# Patient Record
Sex: FEMALE | Race: WHITE | NOT HISPANIC OR LATINO | URBAN - METROPOLITAN AREA
[De-identification: names, ages, dates, MRNs, and addresses within clinical notes are randomized per-mention and may not be internally consistent; named-entity substitution may affect disease eponyms.]

---

## 2022-05-10 ENCOUNTER — PREPPED CHART (OUTPATIENT)
Dept: URBAN - METROPOLITAN AREA CLINIC 39 | Facility: CLINIC | Age: 87
End: 2022-05-10

## 2022-06-07 ENCOUNTER — CONSULTATION/EVALUATION (OUTPATIENT)
Dept: URBAN - METROPOLITAN AREA CLINIC 39 | Facility: CLINIC | Age: 87
End: 2022-06-07

## 2022-06-07 DIAGNOSIS — H18.513: ICD-10-CM

## 2022-06-07 DIAGNOSIS — H35.372: ICD-10-CM

## 2022-06-07 DIAGNOSIS — H25.813: ICD-10-CM

## 2022-06-07 PROCEDURE — 99204 OFFICE O/P NEW MOD 45 MIN: CPT

## 2022-06-07 PROCEDURE — 92134 CPTRZ OPH DX IMG PST SGM RTA: CPT

## 2022-06-07 PROCEDURE — 92015 DETERMINE REFRACTIVE STATE: CPT

## 2022-06-07 PROCEDURE — 92286 ANT SGM IMG I&R SPECLR MIC: CPT

## 2022-06-07 PROCEDURE — V2799PMN IMPRIMIS PRED-MOXI-NEPAF 5ML

## 2022-06-07 PROCEDURE — 92136TC INTERFEROMETRY - TECHNICAL COMPONENT

## 2022-06-07 ASSESSMENT — VISUAL ACUITY
OD_BAT: 20/80
OD_CC: J2
OS_CC: J3
OD_SC: J12
OS_BAT: 20/100
OD_CC: 20/40+2
OS_AM: 20/20
OD_RAM: 20/20
OD_SC: 20/80+1
OS_SC: 20/80
OS_SC: J6
OS_CC: 20/40+2

## 2022-06-07 ASSESSMENT — TONOMETRY
OS_IOP_MMHG: 13
OD_IOP_MMHG: 12

## 2022-06-14 ENCOUNTER — SURGERY/PROCEDURE (OUTPATIENT)
Dept: URBAN - METROPOLITAN AREA CLINIC 39 | Facility: CLINIC | Age: 87
End: 2022-06-14

## 2022-06-14 ENCOUNTER — PRE-OP/H&P (OUTPATIENT)
Dept: URBAN - METROPOLITAN AREA CLINIC 39 | Facility: CLINIC | Age: 87
End: 2022-06-14

## 2022-06-14 DIAGNOSIS — H25.813: ICD-10-CM

## 2022-06-14 PROCEDURE — 66982 XCAPSL CTRC RMVL CPLX WO ECP: CPT

## 2022-06-14 PROCEDURE — 99211T TECH SERVICE

## 2022-06-15 ENCOUNTER — POST-OP (OUTPATIENT)
Dept: URBAN - METROPOLITAN AREA CLINIC 39 | Facility: CLINIC | Age: 87
End: 2022-06-15

## 2022-06-15 DIAGNOSIS — Z96.1: ICD-10-CM

## 2022-06-15 PROCEDURE — 99024 POSTOP FOLLOW-UP VISIT: CPT

## 2022-06-15 ASSESSMENT — VISUAL ACUITY
OS_SC: 20/60-2
OD_SC: 20/80
OD_SC: J12
OS_SC: J10

## 2022-06-15 ASSESSMENT — TONOMETRY
OS_IOP_MMHG: 20
OD_IOP_MMHG: 17

## 2022-06-20 ENCOUNTER — POST OP/EVAL OF SECOND EYE (OUTPATIENT)
Dept: URBAN - METROPOLITAN AREA CLINIC 39 | Facility: CLINIC | Age: 87
End: 2022-06-20

## 2022-06-20 DIAGNOSIS — H35.372: ICD-10-CM

## 2022-06-20 DIAGNOSIS — H25.811: ICD-10-CM

## 2022-06-20 DIAGNOSIS — H18.513: ICD-10-CM

## 2022-06-20 DIAGNOSIS — Z96.1: ICD-10-CM

## 2022-06-20 PROCEDURE — 92012 INTRM OPH EXAM EST PATIENT: CPT

## 2022-06-20 PROCEDURE — 99024 POSTOP FOLLOW-UP VISIT: CPT

## 2022-06-20 ASSESSMENT — TONOMETRY
OS_IOP_MMHG: 16
OD_IOP_MMHG: 14

## 2022-06-20 ASSESSMENT — VISUAL ACUITY
OS_SC: 20/80-1
OS_SC: J8
OD_BAT: 20/80
OD_SC: 20/80+1
OD_SC: J12

## 2022-06-20 NOTE — PATIENT DISCUSSION
Cataract surgery has been performed in the first eye and activities of daily living are still impaired. The patient would like to proceed with cataract surgery in the second eye as scheduled. The patient elects Basic OD, goal of [place]ia.

## 2022-06-21 ENCOUNTER — PRE-OP/H&P (OUTPATIENT)
Dept: URBAN - METROPOLITAN AREA CLINIC 39 | Facility: CLINIC | Age: 87
End: 2022-06-21

## 2022-06-21 ENCOUNTER — SURGERY/PROCEDURE (OUTPATIENT)
Dept: URBAN - METROPOLITAN AREA CLINIC 39 | Facility: CLINIC | Age: 87
End: 2022-06-21

## 2022-06-21 DIAGNOSIS — H25.811: ICD-10-CM

## 2022-06-21 PROCEDURE — 66984 XCAPSL CTRC RMVL W/O ECP: CPT

## 2022-06-21 PROCEDURE — 99211T TECH SERVICE

## 2022-06-22 ENCOUNTER — POST-OP (OUTPATIENT)
Dept: URBAN - METROPOLITAN AREA CLINIC 39 | Facility: CLINIC | Age: 87
End: 2022-06-22

## 2022-06-22 DIAGNOSIS — Z96.1: ICD-10-CM

## 2022-06-22 PROCEDURE — 99024 POSTOP FOLLOW-UP VISIT: CPT

## 2022-06-22 ASSESSMENT — TONOMETRY
OD_IOP_MMHG: 17
OS_IOP_MMHG: 16

## 2022-06-22 ASSESSMENT — VISUAL ACUITY
OS_SC: J5
OD_SC: J8
OU_SC: J8
OS_SC: 20/60-2
OD_SC: 20/50-2
OU_SC: 20/60-2

## 2022-08-04 ENCOUNTER — POST-OP (OUTPATIENT)
Dept: URBAN - METROPOLITAN AREA CLINIC 39 | Facility: CLINIC | Age: 87
End: 2022-08-04

## 2022-08-04 DIAGNOSIS — Z96.1: ICD-10-CM

## 2022-08-04 PROCEDURE — 99024 POSTOP FOLLOW-UP VISIT: CPT

## 2022-08-04 ASSESSMENT — VISUAL ACUITY
OD_SC: 20/70+2
OD_SC: J7
OS_SC: 20/60-2
OS_SC: J7

## 2022-08-04 ASSESSMENT — TONOMETRY
OD_IOP_MMHG: 14
OS_IOP_MMHG: 14

## 2022-10-07 ENCOUNTER — CONTACT LENSES/GLASSES VISIT (OUTPATIENT)
Dept: URBAN - METROPOLITAN AREA CLINIC 39 | Facility: CLINIC | Age: 87
End: 2022-10-07

## 2022-10-07 DIAGNOSIS — H35.372: ICD-10-CM

## 2022-10-07 PROCEDURE — 92015GRNC REFRACTION GLASSES RECHECK - NO CHARGE

## 2022-10-07 ASSESSMENT — VISUAL ACUITY
OS_CC: 20/40
OU_CC: J1
OD_CC: J1
OS_CC: J2
OD_CC: 20/30-2
OU_CC: 20/30-2

## 2022-10-07 NOTE — PATIENT DISCUSSION
Patient became argumentative about wait time and when I asked if I could recheck her refraction in OD.   Recommend patient see [facility] due to my wait time.

## 2022-10-07 NOTE — PATIENT DISCUSSION
10/7/2022:  explained to patient THIS is the difference in visual quality in OS (not truly a glasses issue).  Ed NO glasses will make OS equal in quality of vision OD.

## 2023-02-03 NOTE — PATIENT DISCUSSION
Recent PHQ 2/9 Score    PHQ 2:  Date Adult PHQ 2 Score Adult PHQ 2 Interpretation   9/25/2020 0 No further screening needed     Health Maintenance Due   Topic Date Due   • Pneumococcal Vaccine 0-64 (1 of 3 - PCV13) 08/27/1969   • Shingles Vaccine (1 of 2) 08/27/2015   • Influenza Vaccine (1) 09/01/2020   • Depression Screening  12/18/2020       Patient is due for topics as listed above but is not proceeding with Immunization(s) Shingles at this time.                 Recommended artificial tears to use: 1 drop 4x a day in both eyes.

## 2023-09-19 ENCOUNTER — COMPREHENSIVE EXAM (OUTPATIENT)
Dept: URBAN - METROPOLITAN AREA CLINIC 39 | Facility: CLINIC | Age: 88
End: 2023-09-19

## 2023-09-19 DIAGNOSIS — H43.813: ICD-10-CM

## 2023-09-19 DIAGNOSIS — H52.4: ICD-10-CM

## 2023-09-19 DIAGNOSIS — H18.513: ICD-10-CM

## 2023-09-19 DIAGNOSIS — H52.03: ICD-10-CM

## 2023-09-19 DIAGNOSIS — H01.133: ICD-10-CM

## 2023-09-19 DIAGNOSIS — H35.372: ICD-10-CM

## 2023-09-19 DIAGNOSIS — H52.203: ICD-10-CM

## 2023-09-19 DIAGNOSIS — H04.123: ICD-10-CM

## 2023-09-19 DIAGNOSIS — H01.136: ICD-10-CM

## 2023-09-19 PROCEDURE — 92134 CPTRZ OPH DX IMG PST SGM RTA: CPT

## 2023-09-19 PROCEDURE — 92015 DETERMINE REFRACTIVE STATE: CPT

## 2023-09-19 PROCEDURE — 92014 COMPRE OPH EXAM EST PT 1/>: CPT

## 2023-09-19 RX ORDER — NEOMYCIN SULFATE, POLYMYXIN B SULFATE AND DEXAMETHASONE 3.5; 10000; 1 MG/G; [USP'U]/G; MG/G
OINTMENT OPHTHALMIC TWICE A DAY
Start: 2023-02-03

## 2023-09-19 ASSESSMENT — VISUAL ACUITY
OS_CC: 20/30
OS_SC: 20/50
OS_CC: J2
OU_SC: 20/50
OD_SC: 20/70
OD_CC: J2
OD_SC: J12
OD_CC: 20/40-1
OU_CC: 20/25-2
OU_CC: J2
OU_SC: J12
OS_SC: J12

## 2023-09-19 ASSESSMENT — TONOMETRY
OS_IOP_MMHG: 14
OD_IOP_MMHG: 14

## 2024-08-22 NOTE — PATIENT DISCUSSION
Patient Education       Preventing Falls   The Basics   Written by the doctors and editors at [facility]   Am I at risk of falling? -- Your risk of falling increases as you grow older. That's because getting older can make it harder to walk steadily and keep your balance. Also, the effects of falls are more serious in older people.  Overall, 3 to 4 out of every 10 people over the age of 65 fall each year. Up to 75 percent of people who fracture a hip never recover to the point they were before they had their fracture. If you have fallen in the past, you are at higher risk of falling again.  Several things can increase your risk of a fall, including:  Illness  A change in the medicines you take  An unsafe or unfamiliar setting (for example, a room with rugs or furniture that might trip you, or an area you don't know well)  How can my doctor help me to avoid falling? -- Your doctor can talk to you about the following things:  Past falls - It is important to tell your doctor about any times you have fallen or almost fallen. He or she can then suggest ways to prevent another fall.  Your health conditions - Some health problems can put you at risk of falling. These include conditions that affect eyesight, hearing, muscle strength, or balance.  The medicines you take - Certain medicines can increase the risk of falling. These include some medicines that are used for sleeping problems, anxiety, high blood pressure, or depression. Adding new medicines, or changing doses of some medicines, can also affect your risk of falling.  The more your doctor knows about your situation, the better he or she will be able to help you. For example, if you fell because you have a condition that causes pain, your doctor might suggest treatments to deal with the pain. Or if one of your medicines is making you dizzy and more likely to fall, your doctor might switch you to a different medicine.  Is there anything I can do on my own? -- Yes. To  Patient made aware of 24/7 emergency services. help keep from falling, you can:  Make your home safer - To avoid falling at home, get rid of things that might make you trip or slip. This might include furniture, electrical cords, clutter, and loose rugs (figure 1). Keep your home well-lit so that you can easily see where you are going. Avoid storing things in high places so you don't have to reach or climb.  Wear sturdy shoes that fit well - Wearing shoes with high heels or slippery soles, or shoes that are too loose, can lead to falls. Walking around in bare feet, or only socks, can also increase your risk of falling.  Take vitamin D pills - Taking vitamin D might lower the risk of falls in older people. This is because vitamin D helps make bones and muscles stronger. Your doctor can talk to you about whether you should take extra vitamin D, and how much.  Stay active - Exercising on a regular basis can help lower your risk of falling. It might also help prevent you from getting hurt if you do fall. It is best to do a few different activities that help with both strength and balance. There are many kinds of exercise that can be safe for older people. These include walking, swimming, and Vito Chi (a Chinese martial art that involves slow, gentle movements).  Use a cane, walker, and other safety devices - If your doctor recommends that you use a cane or walker, be sure that it's the right size and you know how to use it. There are other devices that might help you avoid falling, too. These include grab bars or a sturdy seat for the shower, non-slip bath mats, and hand rails or treads for the stairs (to prevent slipping).  If you worry that you could fall, there are also alarm buttons that let you call for help if you fall and can't get up.  What should I do if I fall? -- If you fall, see your doctor right away, even if you aren't hurt. Your doctor can try to figure out what caused you to fall, and how likely you are to fall again. He or she will do an exam and  talk to you about your health problems, medicines, and activities. Then he or she can suggest things you can do to avoid falling again.  Many older people have a hard time recovering after a fall. Doing things to prevent falling can help you to protect your health and independence.  All topics are updated as new evidence becomes available and our peer review process is complete.  This topic retrieved from [employer] on: Sep 21, 2021.  Topic 87871 Version 18.0  Release: 29.4.2 - C29.263  © 2021 UpToDate, Inc. and/or its affiliates. All rights reserved.  figure 1: How to avoid falling at home     This picture shows some of the things that can cause a fall in your home. Look around and remove any loose rugs, electrical cords, clutter, or furniture that could trip you.  Graphic 11692 Version 1.0    Consumer Information Use and Disclaimer   This information is not specific medical advice and does not replace information you receive from your health care provider. This is only a brief summary of general information. It does NOT include all information about conditions, illnesses, injuries, tests, procedures, treatments, therapies, discharge instructions or life-style choices that may apply to you. You must talk with your health care provider for complete information about your health and treatment options. This information should not be used to decide whether or not to accept your health care provider's advice, instructions or recommendations. Only your health care provider has the knowledge and training to provide advice that is right for you. The use of this information is governed by the [employer] End User License Agreement, available at https://www.[email or web address].[place]/en/solutions/[employer] [place]/about/ashvin.The use of [employer] content is governed by the [employer] Terms of Use. ©2021 [employer]Date, Inc. All rights reserved.  Copyright   © 2021 Up[employer]Date, Inc. and/or its affiliates. All rights reserved.

## 2024-11-22 ENCOUNTER — COMPREHENSIVE EXAM (OUTPATIENT)
Dept: URBAN - METROPOLITAN AREA CLINIC 39 | Facility: CLINIC | Age: 89
End: 2024-11-22

## 2024-11-22 DIAGNOSIS — H35.372: ICD-10-CM

## 2024-11-22 DIAGNOSIS — H52.203: ICD-10-CM

## 2024-11-22 DIAGNOSIS — H43.813: ICD-10-CM

## 2024-11-22 DIAGNOSIS — H52.03: ICD-10-CM

## 2024-11-22 DIAGNOSIS — H52.4: ICD-10-CM

## 2024-11-22 DIAGNOSIS — H04.123: ICD-10-CM

## 2024-11-22 DIAGNOSIS — H18.513: ICD-10-CM

## 2024-11-22 PROCEDURE — 92014 COMPRE OPH EXAM EST PT 1/>: CPT

## 2024-11-22 PROCEDURE — 92134 CPTRZ OPH DX IMG PST SGM RTA: CPT | Mod: 25

## 2024-11-22 PROCEDURE — 92015 DETERMINE REFRACTIVE STATE: CPT
